# Patient Record
Sex: MALE | Race: WHITE
[De-identification: names, ages, dates, MRNs, and addresses within clinical notes are randomized per-mention and may not be internally consistent; named-entity substitution may affect disease eponyms.]

---

## 2022-04-09 ENCOUNTER — HOSPITAL ENCOUNTER (EMERGENCY)
Dept: HOSPITAL 33 - ED | Age: 72
Discharge: HOME | End: 2022-04-09
Payer: MEDICARE

## 2022-04-09 VITALS — DIASTOLIC BLOOD PRESSURE: 110 MMHG | SYSTOLIC BLOOD PRESSURE: 162 MMHG | OXYGEN SATURATION: 95 % | HEART RATE: 77 BPM

## 2022-04-09 DIAGNOSIS — J02.9: ICD-10-CM

## 2022-04-09 DIAGNOSIS — E78.5: ICD-10-CM

## 2022-04-09 DIAGNOSIS — Z95.810: ICD-10-CM

## 2022-04-09 DIAGNOSIS — K21.9: ICD-10-CM

## 2022-04-09 DIAGNOSIS — Z79.891: ICD-10-CM

## 2022-04-09 DIAGNOSIS — B27.90: Primary | ICD-10-CM

## 2022-04-09 DIAGNOSIS — I10: ICD-10-CM

## 2022-04-09 DIAGNOSIS — R50.9: ICD-10-CM

## 2022-04-09 DIAGNOSIS — Z79.52: ICD-10-CM

## 2022-04-09 DIAGNOSIS — Z79.01: ICD-10-CM

## 2022-04-09 LAB
ALBUMIN SERPL-MCNC: 4.3 G/DL (ref 3.5–5)
ALP SERPL-CCNC: 98 U/L (ref 38–126)
ALT SERPL-CCNC: 21 U/L (ref 0–50)
ANION GAP SERPL CALC-SCNC: 14.2 MEQ/L (ref 5–15)
AST SERPL QL: 29 U/L (ref 17–59)
BASOPHILS # BLD AUTO: 0.01 10*3/UL (ref 0–0.4)
BILIRUB BLD-MCNC: 0.7 MG/DL (ref 0.2–1.3)
BUN SERPL-MCNC: 21 MG/DL (ref 9–20)
CALCIUM SPEC-MCNC: 9.2 MG/DL (ref 8.4–10.2)
CHLORIDE SERPL-SCNC: 105 MMOL/L (ref 98–107)
CO2 SERPL-SCNC: 27 MMOL/L (ref 22–30)
CREAT SERPL-MCNC: 0.92 MG/DL (ref 0.66–1.25)
EOSINOPHIL # BLD AUTO: 0.39 10*3/UL (ref 0–0.5)
FLUAV AG NPH QL IA: NEGATIVE
FLUBV AG NPH QL IA: NEGATIVE
GFR SERPLBLD BASED ON 1.73 SQ M-ARVRAT: > 60 ML/MIN
GLUCOSE SERPL-MCNC: 105 MG/DL (ref 74–106)
HCT VFR BLD AUTO: 40.9 % (ref 42–50)
HGB BLD-MCNC: 13.5 GM/DL (ref 12.5–18)
INR PPP: 1.39 (ref 0.8–3)
LYMPHOCYTES # SPEC AUTO: 1.83 10*3/UL (ref 1–4.6)
MCH RBC QN AUTO: 29.3 PG (ref 26–32)
MCHC RBC AUTO-ENTMCNC: 33 G/DL (ref 32–36)
MONOCYTES # BLD AUTO: 1.3 10*3/UL (ref 0–1.3)
PLATELET # BLD AUTO: 334 K/MM3 (ref 150–450)
POTASSIUM SERPLBLD-SCNC: 4.5 MMOL/L (ref 3.5–5.1)
PROT SERPL-MCNC: 7.4 G/DL (ref 6.3–8.2)
PROTHROMBIN TIME: 16.4 SECONDS (ref 9.4–12.5)
RBC # BLD AUTO: 4.6 M/MM3 (ref 4.1–5.6)
RSV AG SPEC QL IA: NEGATIVE
SARS-COV-2 AG RESP QL IA.RAPID: NEGATIVE
SODIUM SERPL-SCNC: 141 MMOL/L (ref 137–145)
WBC # BLD AUTO: 10.3 K/MM3 (ref 4–10.5)

## 2022-04-09 PROCEDURE — 86308 HETEROPHILE ANTIBODY SCREEN: CPT

## 2022-04-09 PROCEDURE — 80053 COMPREHEN METABOLIC PANEL: CPT

## 2022-04-09 PROCEDURE — 85025 COMPLETE CBC W/AUTO DIFF WBC: CPT

## 2022-04-09 PROCEDURE — 36415 COLL VENOUS BLD VENIPUNCTURE: CPT

## 2022-04-09 PROCEDURE — 93005 ELECTROCARDIOGRAM TRACING: CPT

## 2022-04-09 PROCEDURE — 96374 THER/PROPH/DIAG INJ IV PUSH: CPT

## 2022-04-09 PROCEDURE — 36000 PLACE NEEDLE IN VEIN: CPT

## 2022-04-09 PROCEDURE — 85610 PROTHROMBIN TIME: CPT

## 2022-04-09 PROCEDURE — 70360 X-RAY EXAM OF NECK: CPT

## 2022-04-09 PROCEDURE — 99284 EMERGENCY DEPT VISIT MOD MDM: CPT

## 2022-04-09 PROCEDURE — 71045 X-RAY EXAM CHEST 1 VIEW: CPT

## 2022-04-09 PROCEDURE — 84484 ASSAY OF TROPONIN QUANT: CPT

## 2022-04-09 PROCEDURE — 87651 STREP A DNA AMP PROBE: CPT

## 2022-04-09 PROCEDURE — 94760 N-INVAS EAR/PLS OXIMETRY 1: CPT

## 2022-04-09 PROCEDURE — 0241U: CPT

## 2022-04-09 PROCEDURE — 87040 BLOOD CULTURE FOR BACTERIA: CPT

## 2022-04-09 NOTE — XRAY
Indication: Cough and hoarseness.



Comparison: None



AP/lateral soft tissue neck demonstrates widely patent supra and infraglottic

airway.  Normal epiglottis.  Osseous structures intact with osteopenia and

mild multilevel cervical degenerative spondylosis.  Patient edentulous.



Comment: Preliminary interpretation made by C.  No critical discrepancy.

## 2022-04-09 NOTE — ERPHSYRPT
- History of Present Illness


Time Seen by Provider: 04/09/22 17:18


Source: patient


Exam Limitations: no limitations


Physician History: 





This is a 72-year-old white male patient of nurse practitioner Jacqui Betancur who 

has a history of atrial fibrillation/flutter with a pacemaker and defibrillator 

in place and is on Xarelto who also has hypertension, elevated cholesterol and 

gastroesophageal reflux disease.  The patient was brought in by a friend who 

helps care for him.  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  He felt hot came 

back in the house and then put a jacket on and went back outside to finish 

mowing.  In the evening he started having a sore throat.  This morning, his 

throat continued to feel worse and he was not speaking as clearly as usual per 

the patient friend.  He is not short of breath.  He is tolerating his saliva and

is able to drink liquids without any problems.  He arrives emergency department 

without stridor.  He was having some chest pain in the last couple days but it 

was fleeting and intermittent and mild.  He does not have chest pain at this 

time.  Patient is scheduled for sleep study on 4/11/2022.  He does have a low-

grade fever today upon arrival to the emergency department.


Timing/Duration: yesterday (Evening)


Activities at Onset: none


Severity of Dyspnea-Max: none


Severity of Dyspnea-Current: none


Possible Cause: no prior episodes


Modifying Factors: Improves With: other (Mild sore throat when swallowing)


Associated Symptoms: No chest pain/discomfort (No chest pain now but did have 

some intermittent fleeting mild anterior nonradiating chest tightness in the 

last couple of days)


Allergies/Adverse Reactions: 








penicillin G Allergy (Verified 07/27/13 09:58)


   





Home Medications: 








Omeprazole 20 MG [Prilosec 20 mg] 20 mg PO DAILY 07/27/13 [History]


Amiodarone HCl [Pacerone] 200 mg PO DAILY 04/09/22 [History]


Carvedilol [Coreg] 25 mg PO DAILY 04/09/22 [History]


Finasteride 5 mg*** [Proscar 5 MG***] 5 mg PO DAILY 04/09/22 [History]


Pravastatin Sodium 20 mg PO DAILY 04/09/22 [History]


Rivaroxaban [Xarelto] 20 mg PO DAILY 04/09/22 [History]


Spironolactone 25 mg*** [Aldactone 25 MG***] 25 mg PO DAILY 04/09/22 [History]


Tamsulosin HCl 0.4 mg*** [Flomax 0.4 MG***] 0.4 mg PO DAILY 04/09/22 [History]





Hx Tetanus, Diphtheria Vaccination/Date Given: No


Hx Influenza Vaccination/Date Given: No


Hx Pneumococcal Vaccination/Date Given: No





Travel Risk





- International Travel


Have you traveled outside of the country in past 3 weeks: No





- Coronavirus Screening


Are you exhibiting any of the following symptoms?: Yes


Symptoms: Fever


Close contact with a COVID-19 positive Pt in past 14-21 Days: No





- Review of Systems


Constitutional: Fever


Eyes: No Symptoms


Ears, Nose, & Throat: Throat Pain


Respiratory: No Cough, No Dyspnea, No Stridor, No Wheezing


Cardiac: Chest Pain (None now but in the last 2 to 3 days has been intermittent 

mild brief nonradiating anterior chest wall pain)


Abdominal/Gastrointestinal: No Symptoms


Genitourinary Symptoms: No Symptoms


Musculoskeletal: No Symptoms


Skin: No Symptoms


Neurological: No Symptoms


Psychological: No Symptoms


Endocrine: No Symptoms


Hematologic/Lymphatic: No Symptoms


Immunological/Allergic: No Symptoms


All Other Systems: Reviewed and Negative





- Past Medical History


Pertinent Past Medical History: Yes


GI Medical History: GERD





- Past Surgical History


Past Surgical History: No





- Social History


Smoking Status: Former smoker


Exposure to second hand smoke: No


Drug Use: none


Patient Lives Alone: No





- Nursing Vital Signs


Nursing Vital Signs: 


                               Initial Vital Signs











Temperature  100.2 F   04/09/22 17:18


 


Pulse Rate  81   04/09/22 17:18


 


Respiratory Rate  22   04/09/22 17:18


 


Blood Pressure  177/99   04/09/22 17:18


 


O2 Sat by Pulse Oximetry  98   04/09/22 17:18








                                   Pain Scale











Pain Intensity                 4

















- Physical Exam


General Appearance: no apparent distress, alert


Eye Exam: PERRL/EOMI, eyes nml inspection


Ears, Nose, Throat Exam: hearing grossly normal, pharyngeal erythema (Mild with 

associated mild pharyngeal swelling)


Neck Exam: normal inspection, non-tender, supple, full range of motion


Respiratory Exam: normal breath sounds, lungs clear, airway intact, No chest 

tenderness, No respiratory distress, No accessory muscle use, No rhonchi, No 

wheezing, No stridor


Cardiovascular/Chest Exam: normal heart sounds, regular rate/rhythm


Abdominal/Gastrointestinal Exam: soft, normal bowel sounds, No tenderness


Rectal Exam: not done


Extremity Exam: non-tender, normal range of motion, no calf tenderness, no pedal

edema, pelvis stable, No normal inspection


Neurologic Exam: alert, oriented x 3, cooperative, CNs II-XII nml as tested, 

normal mood/affect, nml cerebellar function, nml station & gait, sensation nml


Skin Exam: normal color, warm, dry


Lymphatic Exam: No adenopathy


**SpO2 Interpretation**: normal





- Course


Nursing assessment & vital signs reviewed: Yes


EKG Interpreted by Me: RATE (80), Sinus Rhythm, NORMAL AXIS, LAFB, Right Bundle 

Branch Block, NORMAL ST-T, Other (Patient has a pacemaker/defibrillator in 

place.  There are few PVCs present on today's EKG.  There is prolonged TN 

interval.  When compared to EKG dated 7/27/2013 there is resolution of his 

atrial fibrillation/flutter)


Ordered Tests: 


                               Active Orders 24 hr











 Category Date Time Status


 


 EKG-ER Only STAT Care  04/09/22 17:32 Active


 


 IV Insertion STAT Care  04/09/22 17:32 Active


 


 Pulse Oximetry (ED) STAT Care  04/09/22 17:32 Active


 


 CHEST 1 VIEW (PORTABLE) Stat Exams  04/09/22 17:33 Taken


 


 NECK SOFT TISSUE Stat Exams  04/09/22 17:34 Taken


 


 BLOOD CULTURE Stat Lab  04/09/22 17:40 Received


 


 CBC W DIFF Stat Lab  04/09/22 17:30 Completed


 


 CMP Stat Lab  04/09/22 17:30 Completed


 


 Mono Screen Stat Lab  04/09/22 17:30 Completed


 


 PROTIME WITH INR Stat Lab  04/09/22 17:30 Completed


 


 TROPONIN Q3H Lab  04/09/22 17:30 Completed


 


 TROPONIN Q3H Lab  04/09/22 20:45 Ordered


 


 TROPONIN Q3H Lab  04/09/22 23:45 Ordered


 


 TROPONIN Q3H Lab  04/10/22 02:45 Ordered


 


 TROPONIN Q3H Lab  04/10/22 05:45 Ordered








Medication Summary














Discontinued Medications














Generic Name Dose Route Start Last Admin





  Trade Name Freq  PRN Reason Stop Dose Admin


 


Hydrocodone Bitart/Acetaminophen  10 ml  04/09/22 17:34  04/09/22 17:39





  Hydrocodone/Acetaminophen 5 Ml Udcup  PO  04/09/22 17:35  10 ml





  STAT STA   Administration


 


Hydrocodone Bitart/Acetaminophen  Confirm  04/09/22 17:38 





  Hydrocodone/Acetaminophen 5 Ml Udcup  Administered  04/09/22 17:39 





  Dose  





  10 ml  





  .ROUTE  





  .STK-MED ONE  


 


Methylprednisolone Sodium  0 mg  04/09/22 17:35  04/09/22 17:39





Succinate 125 mg/ Sterile  IV  04/09/22 17:36  125 mg





Water 2 ml  STAT ONE   Administration


 


Methylprednisolone Sodium Succinate  Confirm  04/09/22 17:38 





  Methylprednis Sod Succ 125 Mg/2 Ml Vial***  Administered  04/09/22 17:39 





  Dose  





  125 mg  





  .ROUTE  





  .STK-MED ONE  


 


Sterile Water  Confirm  04/09/22 17:38 





  Water For Injection,Sterile 10 Ml Vial  Administered  04/09/22 17:39 





  Dose  





  10 ml  





  IJ  





  .STK-MED ONE  











Lab/Rad Data: 


                           Laboratory Result Diagrams





                                 04/09/22 17:30 





                                 04/09/22 17:30 





                               Laboratory Results











  04/09/22 04/09/22 04/09/22 Range/Units





  18:01 17:40 17:30 


 


WBC     (4.0-10.5)  K/mm3


 


RBC     (4.1-5.6)  M/mm3


 


Hgb     (12.5-18.0)  gm/dl


 


Hct     (42-50)  %


 


MCV     ()  fl


 


MCH     (26-32)  pg


 


MCHC     (32-36)  g/dl


 


RDW     (11.5-14.0)  %


 


Plt Count     (150-450)  K/mm3


 


MPV     (7.5-11.0)  fl


 


Gran %     (36.0-66.0)  %


 


Eos # (Auto)     (0-0.5)  


 


Absolute Lymphs (auto)     (1.0-4.6)  


 


Absolute Monos (auto)     (0.0-1.3)  


 


Lymphocytes %     (24.0-44.0)  %


 


Monocytes %     (0.0-12.0)  %


 


Eosinophils %     (0.00-5.0)  %


 


Basophils %     (0.0-0.4)  %


 


Absolute Granulocytes     (1.4-6.9)  


 


Basophils #     (0-0.4)  


 


PT     (9.4-12.5)  SECONDS


 


INR     (0.8-3.0)  


 


Sodium     (137-145)  mmol/L


 


Potassium     (3.5-5.1)  mmol/L


 


Chloride     ()  mmol/L


 


Carbon Dioxide     (22-30)  mmol/L


 


Anion Gap     (5-15)  MEQ/L


 


BUN     (9-20)  mg/dL


 


Creatinine     (0.66-1.25)  mg/dL


 


Estimated GFR     ML/MIN


 


Glucose     ()  mg/dL


 


Calcium     (8.4-10.2)  mg/dL


 


Total Bilirubin     (0.2-1.3)  mg/dL


 


AST     (17-59)  U/L


 


ALT     (0-50)  U/L


 


Alkaline Phosphatase     ()  U/L


 


Troponin I     (0.000-0.034)  ng/mL


 


Serum Total Protein     (6.3-8.2)  g/dL


 


Albumin     (3.5-5.0)  g/dL


 


Monoscreen    POSITIVE  (Negative)  


 


Influenza Type A Ag  NEGATIVE    (NEGATIVE)  


 


Influenza Type B Ag  NEGATIVE    (NEGATIVE)  


 


RSV (PCR)  NEGATIVE    (Negative)  


 


SARS-CoV-2 (PCR)  NEGATIVE    (NEGATIVE)  


 


Group A Strep Antibody   NOT DETECTED   (NEGATIVE)  














  04/09/22 04/09/22 04/09/22 Range/Units





  17:30 17:30 17:30 


 


WBC     (4.0-10.5)  K/mm3


 


RBC     (4.1-5.6)  M/mm3


 


Hgb     (12.5-18.0)  gm/dl


 


Hct     (42-50)  %


 


MCV     ()  fl


 


MCH     (26-32)  pg


 


MCHC     (32-36)  g/dl


 


RDW     (11.5-14.0)  %


 


Plt Count     (150-450)  K/mm3


 


MPV     (7.5-11.0)  fl


 


Gran %     (36.0-66.0)  %


 


Eos # (Auto)     (0-0.5)  


 


Absolute Lymphs (auto)     (1.0-4.6)  


 


Absolute Monos (auto)     (0.0-1.3)  


 


Lymphocytes %     (24.0-44.0)  %


 


Monocytes %     (0.0-12.0)  %


 


Eosinophils %     (0.00-5.0)  %


 


Basophils %     (0.0-0.4)  %


 


Absolute Granulocytes     (1.4-6.9)  


 


Basophils #     (0-0.4)  


 


PT   16.4 H   (9.4-12.5)  SECONDS


 


INR   1.39   (0.8-3.0)  


 


Sodium    141  (137-145)  mmol/L


 


Potassium    4.5  (3.5-5.1)  mmol/L


 


Chloride    105  ()  mmol/L


 


Carbon Dioxide    27  (22-30)  mmol/L


 


Anion Gap    14.2  (5-15)  MEQ/L


 


BUN    21 H  (9-20)  mg/dL


 


Creatinine    0.92  (0.66-1.25)  mg/dL


 


Estimated GFR    > 60.0  ML/MIN


 


Glucose    105  ()  mg/dL


 


Calcium    9.2  (8.4-10.2)  mg/dL


 


Total Bilirubin    0.70  (0.2-1.3)  mg/dL


 


AST    29  (17-59)  U/L


 


ALT    21  (0-50)  U/L


 


Alkaline Phosphatase    98  ()  U/L


 


Troponin I  < 0.012    (0.000-0.034)  ng/mL


 


Serum Total Protein    7.4  (6.3-8.2)  g/dL


 


Albumin    4.3  (3.5-5.0)  g/dL


 


Monoscreen     (Negative)  


 


Influenza Type A Ag     (NEGATIVE)  


 


Influenza Type B Ag     (NEGATIVE)  


 


RSV (PCR)     (Negative)  


 


SARS-CoV-2 (PCR)     (NEGATIVE)  


 


Group A Strep Antibody     (NEGATIVE)  














  04/09/22 Range/Units





  17:30 


 


WBC  10.3  (4.0-10.5)  K/mm3


 


RBC  4.60  (4.1-5.6)  M/mm3


 


Hgb  13.5  (12.5-18.0)  gm/dl


 


Hct  40.9 L  (42-50)  %


 


MCV  88.9  ()  fl


 


MCH  29.3  (26-32)  pg


 


MCHC  33.0  (32-36)  g/dl


 


RDW  14.9 H  (11.5-14.0)  %


 


Plt Count  334  (150-450)  K/mm3


 


MPV  10.5  (7.5-11.0)  fl


 


Gran %  65.7  (36.0-66.0)  %


 


Eos # (Auto)  0.39  (0-0.5)  


 


Absolute Lymphs (auto)  1.83  (1.0-4.6)  


 


Absolute Monos (auto)  1.30  (0.0-1.3)  


 


Lymphocytes %  17.8 L  (24.0-44.0)  %


 


Monocytes %  12.6 H  (0.0-12.0)  %


 


Eosinophils %  3.8  (0.00-5.0)  %


 


Basophils %  0.1  (0.0-0.4)  %


 


Absolute Granulocytes  6.75  (1.4-6.9)  


 


Basophils #  0.01  (0-0.4)  


 


PT   (9.4-12.5)  SECONDS


 


INR   (0.8-3.0)  


 


Sodium   (137-145)  mmol/L


 


Potassium   (3.5-5.1)  mmol/L


 


Chloride   ()  mmol/L


 


Carbon Dioxide   (22-30)  mmol/L


 


Anion Gap   (5-15)  MEQ/L


 


BUN   (9-20)  mg/dL


 


Creatinine   (0.66-1.25)  mg/dL


 


Estimated GFR   ML/MIN


 


Glucose   ()  mg/dL


 


Calcium   (8.4-10.2)  mg/dL


 


Total Bilirubin   (0.2-1.3)  mg/dL


 


AST   (17-59)  U/L


 


ALT   (0-50)  U/L


 


Alkaline Phosphatase   ()  U/L


 


Troponin I   (0.000-0.034)  ng/mL


 


Serum Total Protein   (6.3-8.2)  g/dL


 


Albumin   (3.5-5.0)  g/dL


 


Monoscreen   (Negative)  


 


Influenza Type A Ag   (NEGATIVE)  


 


Influenza Type B Ag   (NEGATIVE)  


 


RSV (PCR)   (Negative)  


 


SARS-CoV-2 (PCR)   (NEGATIVE)  


 


Group A Strep Antibody   (NEGATIVE)  














- Progress


Progress: improved, re-examined


Air Movement: good


Progress Note: 





04/09/22 18:21


Chest x-ray shows no acute cardiopulmonary process.  X-ray of the soft tissue of

the neck there is a question of a area of inflammation in the infraglottic 

region.  I will have [facility] read the film.  Depending on their interpretation, we

may or may not need to obtain a CT scan of the soft tissue of the neck.  

Clinically, patient does not have any shortness of breath, he is oxygenating 

well there is no stridor.


04/09/22 18:58


The x-ray of the soft tissues of the neck was sent to [facility].  They do not see 

any acute pathology/process in the soft tissues in the neck.


Blood Culture(s) Obtained: Yes


Counseled pt/family regarding: lab results, diagnosis, need for follow-up, rad 

results





- Departure


Departure Disposition: Home


Clinical Impression: 


 Mononucleosis, Viral pharyngitis





Condition: Stable


Critical Care Time: No


Referrals: 


SHAN BETANCUR [COURTESY STAFF] - Follow up/PCP as directed


Additional Instructions: 


Drink plenty of cold water.  Take your medication as prescribed.  Follow-up with

your primary care physician for further management.


Prescriptions: 


Hydrocodone/Acetaminophen [Hydrocodone-Acetamn 7.5-325/15] 10 ml PO Q8H PRN PRN 

#120 ml MDD 30 ml


 PRN Reason: Cough


Prednisone 10 mg*** [Deltasone 10 mg***] 10 mg PO TID #12 tablet

## 2022-04-09 NOTE — XRAY
Indication: Cough and hoarseness.



Comparison: July 27, 2013.



Portable chest remains hyperinflated and clear.  Heart not enlarged for AP

portable technique with new left pacemaker.  Bony thorax intact.



Impression: Continued nonacute hyperinflated chest with chronic features.

## 2022-12-14 ENCOUNTER — HOSPITAL ENCOUNTER (EMERGENCY)
Dept: HOSPITAL 33 - ED | Age: 72
Discharge: HOME | End: 2022-12-14
Payer: MEDICARE

## 2022-12-14 VITALS — DIASTOLIC BLOOD PRESSURE: 91 MMHG | SYSTOLIC BLOOD PRESSURE: 149 MMHG | HEART RATE: 65 BPM

## 2022-12-14 VITALS — OXYGEN SATURATION: 97 %

## 2022-12-14 DIAGNOSIS — I25.10: ICD-10-CM

## 2022-12-14 DIAGNOSIS — Z20.828: ICD-10-CM

## 2022-12-14 DIAGNOSIS — I10: ICD-10-CM

## 2022-12-14 DIAGNOSIS — R11.2: ICD-10-CM

## 2022-12-14 DIAGNOSIS — R42: ICD-10-CM

## 2022-12-14 DIAGNOSIS — R07.89: Primary | ICD-10-CM

## 2022-12-14 DIAGNOSIS — Z79.01: ICD-10-CM

## 2022-12-14 DIAGNOSIS — Z79.899: ICD-10-CM

## 2022-12-14 LAB
ALBUMIN SERPL-MCNC: 4.1 G/DL (ref 3.5–5)
ALP SERPL-CCNC: 85 U/L (ref 38–126)
ALT SERPL-CCNC: 23 U/L (ref 0–50)
AMYLASE SERPL-CCNC: 76 U/L (ref 30–110)
ANION GAP SERPL CALC-SCNC: 12 MEQ/L (ref 5–15)
APTT PPP: 43.3 SECONDS (ref 25.1–36.5)
AST SERPL QL: 34 U/L (ref 17–59)
BASOPHILS # BLD AUTO: 0.03 X10^3/UL (ref 0–0.4)
BILIRUB BLD-MCNC: 0.8 MG/DL (ref 0.2–1.3)
BNP SERPL-MCNC: 334 PG/ML (ref 0–900)
BUN SERPL-MCNC: 23 MG/DL (ref 9–20)
CALCIUM SPEC-MCNC: 8.5 MG/DL (ref 8.4–10.2)
CHLORIDE SERPL-SCNC: 107 MMOL/L (ref 98–107)
CO2 SERPL-SCNC: 21 MMOL/L (ref 22–30)
CREAT SERPL-MCNC: 1 MG/DL (ref 0.66–1.25)
D DIMER BLD IA.RAPID-MCNC: < 0.19 MG/L (ref 0–0.5)
EOSINOPHIL # BLD AUTO: 0.27 X10^3/UL (ref 0–0.5)
FLUAV AG NPH QL IA: NEGATIVE
FLUBV AG NPH QL IA: NEGATIVE
GFR SERPLBLD BASED ON 1.73 SQ M-ARVRAT: > 60 ML/MIN
GLUCOSE SERPL-MCNC: 113 MG/DL (ref 74–106)
HCT VFR BLD AUTO: 38.3 % (ref 42–50)
HGB BLD-MCNC: 12.5 G/DL (ref 12.5–18)
INR PPP: 1.81 (ref 0.8–3)
LIPASE SERPL-CCNC: 53 U/L (ref 23–300)
LYMPHOCYTES # SPEC AUTO: 1.34 X10^3/UL (ref 1–4.6)
MAGNESIUM SERPL-MCNC: 2 MG/DL (ref 1.6–2.3)
MCH RBC QN AUTO: 29.6 PG (ref 26–32)
MCHC RBC AUTO-ENTMCNC: 32.6 G/DL (ref 32–36)
MONOCYTES # BLD AUTO: 0.61 X10^3/UL (ref 0–1.3)
PLATELET # BLD AUTO: 288 X10^3/UL (ref 150–450)
POTASSIUM SERPLBLD-SCNC: 4.6 MMOL/L (ref 3.5–5.1)
PROT SERPL-MCNC: 7.1 G/DL (ref 6.3–8.2)
PROTHROMBIN TIME: 18.2 SECONDS (ref 9.4–12.5)
RBC # BLD AUTO: 4.23 X10^6/UL (ref 4.1–5.6)
RSV AG SPEC QL IA: NEGATIVE
SARS-COV-2 AG RESP QL IA.RAPID: NEGATIVE
SODIUM SERPL-SCNC: 136 MMOL/L (ref 137–145)
WBC # BLD AUTO: 6.3 X10^3/UL (ref 4–10.5)

## 2022-12-14 PROCEDURE — 84484 ASSAY OF TROPONIN QUANT: CPT

## 2022-12-14 PROCEDURE — 83880 ASSAY OF NATRIURETIC PEPTIDE: CPT

## 2022-12-14 PROCEDURE — 93005 ELECTROCARDIOGRAM TRACING: CPT

## 2022-12-14 PROCEDURE — 36000 PLACE NEEDLE IN VEIN: CPT

## 2022-12-14 PROCEDURE — 71045 X-RAY EXAM CHEST 1 VIEW: CPT

## 2022-12-14 PROCEDURE — 36415 COLL VENOUS BLD VENIPUNCTURE: CPT

## 2022-12-14 PROCEDURE — 0241U: CPT

## 2022-12-14 PROCEDURE — 85730 THROMBOPLASTIN TIME PARTIAL: CPT

## 2022-12-14 PROCEDURE — 85379 FIBRIN DEGRADATION QUANT: CPT

## 2022-12-14 PROCEDURE — 83735 ASSAY OF MAGNESIUM: CPT

## 2022-12-14 PROCEDURE — 83690 ASSAY OF LIPASE: CPT

## 2022-12-14 PROCEDURE — 85610 PROTHROMBIN TIME: CPT

## 2022-12-14 PROCEDURE — 85025 COMPLETE CBC W/AUTO DIFF WBC: CPT

## 2022-12-14 PROCEDURE — 80053 COMPREHEN METABOLIC PANEL: CPT

## 2022-12-14 PROCEDURE — 83605 ASSAY OF LACTIC ACID: CPT

## 2022-12-14 PROCEDURE — 99284 EMERGENCY DEPT VISIT MOD MDM: CPT

## 2022-12-14 PROCEDURE — 82150 ASSAY OF AMYLASE: CPT

## 2022-12-14 NOTE — XRAY
Indication: Chest discomfort.



Comparison: April 9, 2022



Portable chest again demonstrates chronic right hemidiaphragm elevation with

minimal subsegmental atelectasis/scarring.  No focal infiltrate,

consolidation, or large effusion.  Heart not enlarged again with left

pacemaker.  Bony thorax intact again with mild osteopenia and degenerative

changes.



Impression: Continued nonacute chest with chronic features.

## 2023-02-24 ENCOUNTER — HOSPITAL ENCOUNTER (OUTPATIENT)
Dept: HOSPITAL 33 - SDC | Age: 73
Discharge: HOME | End: 2023-02-24
Attending: FAMILY MEDICINE
Payer: MEDICARE

## 2023-02-24 VITALS — OXYGEN SATURATION: 95 %

## 2023-02-24 VITALS — HEART RATE: 65 BPM | DIASTOLIC BLOOD PRESSURE: 83 MMHG | SYSTOLIC BLOOD PRESSURE: 126 MMHG

## 2023-02-24 DIAGNOSIS — Z08: Primary | ICD-10-CM

## 2023-02-24 DIAGNOSIS — K57.30: ICD-10-CM

## 2023-02-24 DIAGNOSIS — Z90.49: ICD-10-CM

## 2023-02-24 DIAGNOSIS — Z85.038: ICD-10-CM

## 2023-02-24 NOTE — OP
SURGERY DATE/TIME:  02/24/2023  0759    



PREOPERATIVE DIAGNOSIS:      History of colon cancer. 



POSTOPERATIVE DIAGNOSIS:      Sigmoid diverticulosis and normal colon status post partial 
right hemicolectomy. 



PROCEDURE:    Colonoscopy.



SURGEON:        Dr. Cruz.



ANESTHESIA:    MAC. Medications given by anesthesia department. 



HISTORY:  The patient is a 72-year-old white male patient who presents now for 
colonoscopic evaluation. His brother-in-law reports the patient has had cancer 
approximately 15 years ago. He had seen his oncologist recently who suggested he repeat 
the colonoscopy. The patient was appraised of the risks of the procedure including the 
risk of perforation, phlebitis, untoward reaction to medication, bleeding and missed 
lesions. The patient verbalized his understanding and desired to have the procedure 
performed.



DESCRIPTION OF PROCEDURE:  The patient was given the medications by the anesthesia 
department. He had continuous pulse oximetry, ECG monitoring and intermittent blood 
pressure monitoring during the examination. He was placed in the left lateral decubitus 
position. A digital rectal examination was performed and revealed normal anal sphincter 
tone, no masses and normal prostate. The flexible Olympus pediatric colonoscope was used 
to intubate the rectum. A view of the colon was developed sequentially to the area of the 
right colon where anastomotic area was noted. No evidence of mucosal abnormalities were 
noted in this area. The scope was withdrawn from the patient with careful inspection upon 
withdrawal.  No mucosal lesions other than sigmoid diverticula were noted.  The scope was 
removed from the patient who tolerated the procedure well and was sent back to OP recovery 
in good condition. The prep was noted to be fair to at times poor.